# Patient Record
Sex: MALE | Race: WHITE
[De-identification: names, ages, dates, MRNs, and addresses within clinical notes are randomized per-mention and may not be internally consistent; named-entity substitution may affect disease eponyms.]

---

## 2017-04-29 ENCOUNTER — HOSPITAL ENCOUNTER (EMERGENCY)
Dept: HOSPITAL 45 - C.EDB | Age: 22
Discharge: HOME | End: 2017-04-29
Payer: COMMERCIAL

## 2017-04-29 VITALS
HEIGHT: 72.01 IN | BODY MASS INDEX: 39.8 KG/M2 | BODY MASS INDEX: 39.8 KG/M2 | WEIGHT: 293.88 LBS | WEIGHT: 293.88 LBS | HEIGHT: 72.01 IN

## 2017-04-29 VITALS — TEMPERATURE: 98.06 F

## 2017-04-29 VITALS — SYSTOLIC BLOOD PRESSURE: 135 MMHG | HEART RATE: 71 BPM | DIASTOLIC BLOOD PRESSURE: 68 MMHG | OXYGEN SATURATION: 95 %

## 2017-04-29 DIAGNOSIS — F17.220: ICD-10-CM

## 2017-04-29 DIAGNOSIS — M25.532: Primary | ICD-10-CM

## 2017-04-29 DIAGNOSIS — M79.641: ICD-10-CM

## 2017-04-29 DIAGNOSIS — Z98.890: ICD-10-CM

## 2017-04-29 NOTE — EMERGENCY ROOM VISIT NOTE
ED Visit Note


First contact with patient:  13:09


Chief Complaint: Left wrist pain.





History of Present Illness: Mr. Shepard is a 22-year-old white male who ambulates 

into the ED complaining of left wrist pain.


Historically patient reports approximately one year ago he donated blood and 

then developed large amount of blood leaking from his wound into his tissues 

over the anterior aspect of the forearm.  He was seen and evaluated here in no 

acute abnormalities were found.  He reports after the resolution of the blood 

in his forearm he reports intermittently, approximately 1 day year, he would 

develop circumferential left wrist pain.  He reports it normally is mild and 

self resolves.


He goes on to report that approximately 2 days ago the pain started as normal 

and since he started working as a  his pain has become severe.  He 

describes the pain as a sharp sensation.  He rates his discomfort 10/10.  His 

pain worsens with the last few degrees of flexion and extension.  He has not 

identified any alleviating factors related to the pain.  He has not taken any 

medications for pain prior to arrival at the hospital.  He denies any 

associated symptoms including recent trauma, elbow pain, forearm pain, hand pain

, finger weakness/numbness/tingling.


Additionally he reports over the last 3-5 days when he awakes in the morning he 

feels like his right hand/fingers are in spasm and he cannot flex or extend any 

of his fingers.  This last for approximately 5-6 minutes and then self 

resolves.  While he is having this spasm like pain he reports he has moderate 

hand pain.  Currently he is pain-free.  He has not taken any medications for 

this discomfort.  He denies any associated symptoms including neck pain, elbow 

pain, forearm pain, hand weakness/numbness/tingling.





Review of Systems: As noted above in history of present illness. 





Past Medical History: As previously noted and status post tonsillectomy.


Current Medications: Patient denies.


Allergies to Medications: Patient denies.


Social History: Patient is currently employed; he feels safe in his home 

environment; he admits to chewing tobacco and alcohol use.





Physical Examination:


Vital Signs: 








  Date Time  Temp Pulse Resp B/P Pulse Ox O2 Delivery O2 Flow Rate FiO2


 


4/29/17 15:01  71 20 135/68 95   


 


4/29/17 12:59 36.7 80 18 132/75 97 Room Air  





GENERAL: 22-year-old male in no acute distress, nontoxic-appearing, afebrile 

and hemodynamically stable.


NEUROLOGICAL: Awake, alert and oriented to person, place and time.  Answering 

questions appropriately and following commands.  Normal gait.  Good hand eye 

coordination.  


SKIN: Warm, dry and pink.  No soft tissue trauma noted.


BACK:  No tenderness over the bony cervical and spine.    


LEFT UPPER EXTREMITY: No gross bony deformity.  No tenderness throughout the 

elbow, forearm, wrist or hand.  No swelling or abnormal skin eruptions.  Full 

range of motion in all movements of the elbow and forearm.  Mild decreased 

range of motion due to pain in the last few degrees of flexion and the last few 

degrees of extension at the wrist.  Was not able to elicit any abnormal 

findings with hyperflexion or hyperextension of the wrist except for mild 

discomfort.  Negative Tenile signs.  5/5 muscle strength in flexion, extension 

and radial and ulnar deviation of the wrist, opposition of all fingers against 

resistance.  Throughout the hand the skin was warm and pink and capillary 

refill is brisk.  He was able to distinguish light sensations through all 

dermatomes of the hands.  


RIGHT UPPER EXTREMITY: No gross bony deformities.  No tenderness throughout the 

elbow, forearm, wrist or hand.  No swelling or abnormal skin eruptions.  Full 

range of motion in all movements of the elbow, forearm, wrist and fingers.  5/5 

muscle strength in flexion, extension, radial and ulnar deviation of the wrist 

and opposition of all fingers.  Throughout the hand the skin was warm and pink 

and capillary refill is brisk.  He was able to distinguish light sensations 

through all dermatomes.





ED Course:


Patient is assessed as noted above.


Patient was given ice for pain and comfort; he refused pain medications.


Left Wrist X-Rays: Were read by myself and the radiologist showing no acute 

fractures or dislocations.  No signs of swelling.


Patient's left wrist was placed back in its lacer splint.


Patient was educated about tonight's findings and instructed on his treatment 

plan; he verbalizes understanding and agreement with this plan.





Clinical Impression: Left wrist pain.  Right hand transient dysfunction.





Decision-Making: Initially my differential diagnosis I considered wrist sprain, 

tendinitis, tenosynovitis, carpal tunnel syndrome and other causes.





Disposition: Patient discharged home in stable condition; prior to departure he 

was reassessed and subjectively reported he was feeling better and rated his 

discomfort 2/10.





Plan:


Comfort measures were discussed with the patient including rest, splint use, 

acetaminophen, ibuprofen and rest.


Additionally patient was given a Medrol Dosepak for any type of inflammation 

and instructed on achieves.


Patient was encouraged to follow-up with orthopedic physician if no better in 6-

7 days.


Patient was encouraged return ED for worsening/uncontrolled pain, uncontrolled 

swelling, hand weakness/numbness/tingling or any new/concerning symptoms.

## 2017-04-29 NOTE — DIAGNOSTIC IMAGING REPORT
LEFT WRIST 4 VIEWS



HISTORY:      Left wrist pain.



COMPARISON: None.



FINDINGS: There is no fracture or dislocation. Soft tissues are unremarkable. No

radiopaque foreign bodies.



IMPRESSION:  

No fractures.







Electronically signed by:  Herbie Miller M.D.

4/29/2017 2:28 PM



Dictated Date/Time:  4/29/2017 2:27 PM

## 2017-09-21 ENCOUNTER — HOSPITAL ENCOUNTER (EMERGENCY)
Dept: HOSPITAL 45 - C.EDB | Age: 22
Discharge: HOME | End: 2017-09-21
Payer: COMMERCIAL

## 2017-09-21 VITALS
BODY MASS INDEX: 36.91 KG/M2 | HEIGHT: 72.01 IN | WEIGHT: 272.49 LBS | WEIGHT: 272.49 LBS | HEIGHT: 72.01 IN | BODY MASS INDEX: 36.91 KG/M2

## 2017-09-21 VITALS — TEMPERATURE: 98.06 F

## 2017-09-21 VITALS — SYSTOLIC BLOOD PRESSURE: 131 MMHG | OXYGEN SATURATION: 97 % | HEART RATE: 76 BPM | DIASTOLIC BLOOD PRESSURE: 81 MMHG

## 2017-09-21 DIAGNOSIS — F17.210: ICD-10-CM
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
## 2017-09-21 DIAGNOSIS — S62.514A: Primary | ICD-10-CM
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
## 2017-09-21 DIAGNOSIS — M25.511: ICD-10-CM

## 2017-09-21 NOTE — DIAGNOSTIC IMAGING REPORT
RIGHT THUMB 3 VIEWS



CLINICAL HISTORY: Right thumb pain status post trauma     



COMPARISON: None.



DISCUSSION: There is a tiny bony density visualized at the base of the volar

aspect of the proximal phalanx. This likely represents a chip/avulsion fracture.

This may be old. Please correlate with the patient's site of pain. There is no

dislocation. There are no other bony findings of significance.



IMPRESSION: Tiny age-indeterminate chip/avulsion fracture arising from the volar

base of the proximal phalanx. Please correlate with the patient's site of pain.







Electronically signed by:  Paulo Conley M.D.

9/21/2017 2:12 PM



Dictated Date/Time:  9/21/2017 2:09 PM

## 2017-09-21 NOTE — DIAGNOSTIC IMAGING REPORT
RIGHT WRIST 5 VIEWS



CLINICAL HISTORY: Punching injury. Right wrist pain.



FINDINGS: 5 views of the right wrist are obtained. Correlation is made with

radiographs of the right hand dated 1/18/2010. The skeletal structures are well

mineralized. No fracture is seen. The joint spaces of the wrist are

well-maintained. A small bone island is suggested in the scaphoid. The overlying

soft tissues are within normal limits.



IMPRESSION: Unremarkable radiographic assessment of the right wrist.







Electronically signed by:  Felipe Collier M.D.

9/21/2017 2:12 PM



Dictated Date/Time:  9/21/2017 2:11 PM

## 2017-09-21 NOTE — EMERGENCY ROOM VISIT NOTE
History


First contact with patient:  13:03


Chief Complaint:  FINGER PAIN


Stated Complaint:  THUMB AND SHOULDER PAIN





History of Present Illness


The patient is a 22 year old male who presents to the Emergency Room via 

private vehicle with complaints of "thumb and shoulder pain".  The patient 

states that back a few weeks ago at the [facility], his friend taught him a 

new way of punching, and he was punching a punching bag, and believes he 

injured his right shoulder.  He now feels a popping sensation in the right 

shoulder every time he rotates it, and also notes pain at the base of the right 

thumb, as well as a swollen area at the IP joint of the right thumb.  He notes 

he did not come in at the time of the injury secondary to having no insurance 

at that time.  He is right-handed.  He denies any numbness or tingling down 

this region.





Review of Systems


A complete 6-point Review of Systems was discussed with the patient, with 

pertinent positives and negatives listed in the History of Present Illness. All 

remaining Review of Systems questions can be considered negative unless 

otherwise specified.





Past Medical/Surgical History


Medical Problems:


(1) Achilles tendinitis


(2) Nausea & vomiting


(3) No Known Active Medical Problems


(4) No significant medical problems


(5) Right ankle sprain


Surgical Problems:


(1) Tonsillectomy








Family History





No pertinent family history





Social History


Smoking Status:  Light Tobacco Smoker


Alcohol Use:  occasionally


Drug Use:  none


Marital Status:  single


Housing Status:  lives with family


Occupation Status:  employed





Current/Historical Medications


No Active Prescriptions or Reported Meds





Physical Exam


Vital Signs











  Date Time  Temp Pulse Resp B/P (MAP) Pulse Ox O2 Delivery O2 Flow Rate FiO2


 


9/21/17 15:04  76 18 131/81 97   


 


9/21/17 12:08 36.7 90 16 145/90 97 Room Air  











Physical Exam


VITAL SIGNS - Vital signs and nursing notes were reviewed.  Stable.


GENERAL -22-year-old male appearing his stated age who is in no acute distress. 

Communicates well with provider and answers questions appropriately.


SKIN - Without rashes.  Skin overlying the right upper extremity is 

unremarkable.


HEAD - NC/AT.


EXTREMITIES - No clubbing or peripheral cyanosis. No pretibial edema present.  

There is tenderness to palpation overlying the patient's right shoulder.  There 

is near full range of motion.  There is a slight clicking sensation at the 

patient's high's point on right arm rotation of the shoulder.  No laxity noted.

  There is also tenderness at the base of the right thumb.  There is also 

slight edema noted to the IP joint of the right thumb with some tenderness.  

Full range of motion of all these regions.  He is neurovascularly intact in 

this region.  +5/5 strength noted in UE/LE bilaterally.





Medical Decision & Procedures


ER Provider


Diagnostic Interpretation:


RIGHT SHOULDER 3 VIEWS





CLINICAL HISTORY: Right shoulder injury.





FINDINGS: 3 views of the right shoulder are compared to study dated 9/12/2013.


The skeletal structures are well mineralized. No fracture or dislocation is


seen. The glenohumeral and acromioclavicular joints are well-maintained. The


overlying soft tissues are within normal limits. Imaged right lung parenchyma


appears clear.





IMPRESSION: Unremarkable radiographic assessment of the right shoulder.











Electronically signed by:  Felipe Collier M.D.


9/21/2017 2:07 PM





Dictated Date/Time:  9/21/2017 2:07 PM





RIGHT WRIST 5 VIEWS





CLINICAL HISTORY: Punching injury. Right wrist pain.





FINDINGS: 5 views of the right wrist are obtained. Correlation is made with


radiographs of the right hand dated 1/18/2010. The skeletal structures are well


mineralized. No fracture is seen. The joint spaces of the wrist are


well-maintained. A small bone island is suggested in the scaphoid. The overlying


soft tissues are within normal limits.





IMPRESSION: Unremarkable radiographic assessment of the right wrist.











Electronically signed by:  Felipe Collier M.D.


9/21/2017 2:12 PM





Dictated Date/Time:  9/21/2017 2:11 PM





RIGHT THUMB 3 VIEWS





CLINICAL HISTORY: Right thumb pain status post trauma     





COMPARISON: None.





DISCUSSION: There is a tiny bony density visualized at the base of the volar


aspect of the proximal phalanx. This likely represents a chip/avulsion fracture.


This may be old. Please correlate with the patient's site of pain. There is no


dislocation. There are no other bony findings of significance.





IMPRESSION: Tiny age-indeterminate chip/avulsion fracture arising from the volar


base of the proximal phalanx. Please correlate with the patient's site of pain.











Electronically signed by:  Paulo Conley M.D.


9/21/2017 2:12 PM





Dictated Date/Time:  9/21/2017 2:09 PM





Medical Decision


Patient was seen and evaluated as above.  He presents to us today status post 

injury did happen a few weeks ago of the right shoulder, right wrist and right 

finger.  This was status post punching in a forward direction towards a 

punching bag.  He is unaware of what may have caused a bump in his right thumb 

on the volar aspect.  Radiographs were obtained of the right shoulder, right 

wrist and right thumb.  Unremarkable for acute process.  The tiny avulsion 

fracture on my clinical examination the patient is insignificant, as there is 

no tenderness overlying this region.  He will nonetheless be splinted for 

comfort secondary to the bump in the skin that is about 3 cm distal to this 

region.  He is also given arm sling for the shoulder.  He is to follow with 

orthopedics.  He was educated upon management.  He was educated upon worrisome 

symptoms which to return, had questions answered prior to discharge, and was 

discharged home in good condition.





In evaluation treatment this patient following differential diagnoses were 

entertained: Fracture, sprain, dislocation, contusion, among others.





Impression





 Primary Impression:  


 Shoulder pain, right


 Additional Impression:  


 Finger fracture, right





Departure Information


Dispostion


Home / Self-Care





Condition


GOOD





Prescriptions





No Active Prescriptions or Reported Meds





Referrals


No Doctor, Assigned (PCP)








Adal Arciniega M.D.





Patient Instructions


My [facility]





Additional Instructions





You have been treated in the Emergency Department for Shoulder Pain, wrist and 

finger pain. 





For pain control, you can use the following over-the-counter medicines (if >11 yo):





- Regular strength (325mg/tab) Tylenol (acetaminophen) 2 tabs every 4-6 hours 

as needed. Do not exceed 12 tablets in a 24 hour period. Avoid taking more than 

3 grams (3000 mg) of Tylenol per day. This includes any other sources of 

acetaminophen you may take on a regular basis.





- Regular strength (200 mg/tab) Advil (ibuprofen) 1-2 tabs every 4-6 hours as 

needed. Do not exceed a dose of 3200 mg per day.





If this is a recent injury (<24 hrs), ice can be applied to the area of pain 

for the first 3 days to help decrease pain and inflammation.





You have been provided the number for an Orthopaedic Surgeon. You should call 

this number as soon as possible to establish a follow-up visit from today's 

Emergency Department visit.





Keep the shoulder brace/sling in place until evaluated by Orthopedics. Continue 

to perform range of motion exercises several times per day to help prevent the 

development of a "frozen shoulder".





Splint for finger until follow up.





Return to the Emergency Department if your current symptoms worsen despite 

treatment course outlined above, or if you develop any of the following symptoms

: intractable pain despite aforementioned treatment course or new onset of 

numbness or tingling of the arm.





Problem Qualifiers

## 2017-09-21 NOTE — DIAGNOSTIC IMAGING REPORT
RIGHT SHOULDER 3 VIEWS



CLINICAL HISTORY: Right shoulder injury.



FINDINGS: 3 views of the right shoulder are compared to study dated 9/12/2013.

The skeletal structures are well mineralized. No fracture or dislocation is

seen. The glenohumeral and acromioclavicular joints are well-maintained. The

overlying soft tissues are within normal limits. Imaged right lung parenchyma

appears clear.



IMPRESSION: Unremarkable radiographic assessment of the right shoulder.







Electronically signed by:  Felipe Collier M.D.

9/21/2017 2:07 PM



Dictated Date/Time:  9/21/2017 2:07 PM

## 2018-03-27 ENCOUNTER — HOSPITAL ENCOUNTER (EMERGENCY)
Dept: HOSPITAL 45 - C.EDB | Age: 23
Discharge: HOME | End: 2018-03-27
Payer: COMMERCIAL

## 2018-03-27 VITALS
HEIGHT: 72.01 IN | WEIGHT: 271.17 LBS | HEIGHT: 72.01 IN | BODY MASS INDEX: 36.73 KG/M2 | BODY MASS INDEX: 36.73 KG/M2 | WEIGHT: 271.17 LBS

## 2018-03-27 VITALS
SYSTOLIC BLOOD PRESSURE: 162 MMHG | OXYGEN SATURATION: 97 % | HEART RATE: 82 BPM | DIASTOLIC BLOOD PRESSURE: 92 MMHG | TEMPERATURE: 98.06 F

## 2018-03-27 DIAGNOSIS — K08.89: Primary | ICD-10-CM

## 2018-03-27 DIAGNOSIS — Z98.818: ICD-10-CM

## 2018-03-27 NOTE — EMERGENCY ROOM VISIT NOTE
ED Visit Note


First contact with patient:  14:41


CHIEF COMPLAINT: "Mouth pain".





HISTORY OF PRESENT ILLNESS:  This 23-year-old male patient presented to the 

emergency department via private vehicle with complaints of dental pain x the 

past few hours following 5 teeth extracted in [facility] by Dr. Santos 

around 11 AM.  The patient believes it is coming from the posterior region 

where the teeth were pulled (4 were wisdom teeth).  The pain is now steady and 

severe and radiates to the face.  He also notes bleeding from this region.  He 

was prescribed pain medication but did not pick these up yet he did call back 

to the dentist office and spoke with the /nurse and was recommended 

to go back there for evaluation but he notes to to the distance of traveling he 

came here instead.  He rates his overall pain as a 10/10.  He states that the 

procedure was full sedation.  I am going to totally 





 


REVIEW OF SYSTEMS:   A 6 system review of systems was completed with positives 

and pertinent negatives listed in the HPI.  








ALLERGIES: none listed








MEDICATIONS: As noted below








PMH:    No pertinent








SOCIAL HISTORY:    Patient lives locally








PHYSICAL EXAM: Vitals are noted on the nurse's note and reviewed by myself.  

Vital signs stable.  Temperature 36.7C orally.  GENERAL: 23-year-old male, in 

no acute distress, nondiaphoretic, well-developed well-nourished.   Mouth: 

There is evidence of wisdom teeth extraction 4, as well as another molar that 

has been removed.  There is only minimal bleeding.  No evidence of dry socket.  

No evidence of infection.  There is no hemorrhaging.  Airway is widely patent.  

The remainder of the pharynx and tonsils are without erythema, edema, or 

exudate.  The airway is patent.  There is no facial swelling, cervical or 

submandibular lymphadenopathy.  The patient appears uncomfortable and in pain.  

The patient has overall fair dental hygiene.  EARS: External auditory canals 

clear, tympanic membranes pearly gray without erythema or effusion bilaterally.








ED COURSE: Patient was seen and evaluated as above.  He presents to us today 

with dental pain following 5 teeth extraction procedure just hours ago.  He has 

not taken his pain medication.  I suspect that the local /general anesthesia 

effect has worn off.  The patient states that he did drive here but notes that 

he does not want to make his  who took him to the appointment drive him 

back there to the dental office that is why he came here instead of going back 

to the dentist.  There is no emergent process on exam.  He was given IM Toradol 

and feeling better.  He was able to be more calm.  He will leave here and go 

 the pain medication and follow with the dentist/return with worsening.  

He was educated upon management, educated upon worrisome symptoms which to 

return, had questions in spite of discharge, and was discharged home in good 

condition.





In the evaluation and treatment of this patient, the following differential 

diagnoses were considered: Periapical Abscess, Osteonecrosis of the Jaw, Dental 

Fracture, Dental Caries, Dean's Angina, Vincent's Angina, Facial Cellulitis.


Problem List


Medical Problems:


(1) Achilles tendinitis


Status: Resolved  





(2) Nausea & vomiting


Status: Resolved  





(3) No significant medical problems


Status: Chronic  





(4) Right ankle sprain


Status: Resolved  





Surgical Problems:


(1) Tonsillectomy


Status: Chronic  











Current/Historical Medications


No Active Prescriptions or Reported Meds





Allergies


Coded Allergies:  


     No Known Allergies (Unverified , 9/21/17)





Vital Signs











  Date Time  Temp Pulse Resp B/P (MAP) Pulse Ox O2 Delivery O2 Flow Rate FiO2


 


3/27/18 15:50 36.7 82 16 162/92 97   


 


3/27/18 14:37 36.7 82 16 162/92 97   











Medications Administered











 Medications


  (Trade)  Dose


 Ordered  Sig/Pardeep


 Route  Start Time


 Stop Time Status Last Admin


Dose Admin


 


 Ketorolac


 Tromethamine


  (Toradol Inj)  30 mg  NOW  STAT


 IM  3/27/18 14:49


 3/27/18 14:50 DC 3/27/18 14:49


30 MG











Departure Information


Impression





 Primary Impression:  


 S/P tooth extraction


 Additional Impression:  


 Mouth pain





Dispostion


Home / Self-Care





Condition


GOOD





Prescriptions





No Active Prescriptions or Reported Meds





Referrals


No Doctor, Assigned (PCP)





Patient Instructions


My [facility]





Additional Instructions





You have been treated in the Emergency Department for Dental Pain. 





Pain meds at pharmacy as previously prescribed.





For pain control, you can use the following over-the-counter medicines:





Refrain from smoking cigarettes or using chewing tobacco until you have been 

evaluated by your dentist. Keeping beverages lukewarm and consuming soft foods 

can decrease your pain. Warm compresses over the affected area may offer some 

relief.





You MUST seek evaluation of your dental pain by a dentist following your visit 

to the Emergency Department. Keep your follow up wednesday.





Return to the emergency department if you develop the following symptoms 

despite treatment course outlined above: fever, intractable pain, increased 

redness, swelling, or purulent discharge.





Problem Qualifiers